# Patient Record
Sex: FEMALE
[De-identification: names, ages, dates, MRNs, and addresses within clinical notes are randomized per-mention and may not be internally consistent; named-entity substitution may affect disease eponyms.]

---

## 2019-04-29 PROBLEM — Z00.00 ENCOUNTER FOR PREVENTIVE HEALTH EXAMINATION: Status: ACTIVE | Noted: 2019-04-29

## 2019-04-30 ENCOUNTER — APPOINTMENT (OUTPATIENT)
Dept: ORTHOPEDIC SURGERY | Facility: CLINIC | Age: 84
End: 2019-04-30
Payer: MEDICARE

## 2019-04-30 VITALS — BODY MASS INDEX: 22.49 KG/M2 | HEIGHT: 65 IN | WEIGHT: 135 LBS

## 2019-04-30 DIAGNOSIS — M17.11 UNILATERAL PRIMARY OSTEOARTHRITIS, RIGHT KNEE: ICD-10-CM

## 2019-04-30 DIAGNOSIS — M17.12 UNILATERAL PRIMARY OSTEOARTHRITIS, LEFT KNEE: ICD-10-CM

## 2019-04-30 PROCEDURE — 20610 DRAIN/INJ JOINT/BURSA W/O US: CPT | Mod: 50

## 2019-04-30 PROCEDURE — 99213 OFFICE O/P EST LOW 20 MIN: CPT | Mod: 25

## 2019-04-30 RX ORDER — ACETAMINOPHEN AND CODEINE 300; 30 MG/1; MG/1
300-30 TABLET ORAL 4 TIMES DAILY
Qty: 60 | Refills: 0 | Status: ACTIVE | COMMUNITY
Start: 2019-04-30 | End: 1900-01-01

## 2019-04-30 NOTE — DISCUSSION/SUMMARY
[de-identified] : This patient was given an injection into each knee cortisone. She tolerated procedure well. I will send a prescription for Tylenol with codeine. She will follow up every couple months for injections as she does not want to have surgery.

## 2019-04-30 NOTE — PHYSICAL EXAM
[de-identified] : Bilateral knee exam shows crepitus on range of motion swelling there is joint line tenderness. Negative Zuri test no instability neurovascular exam is normal. Range of motion full extension to 120° of flexion

## 2019-04-30 NOTE — PROCEDURE
[de-identified] : Bilateral knee injections were given with 40 mg of Kenalog 3 cc 1% lidocaine in each knee.

## 2019-04-30 NOTE — HISTORY OF PRESENT ILLNESS
[de-identified] : Here for followup evaluation of both knees. She is here with her grandson. She was last seen in December. She had injections in both knees. She would like repeat injections today.

## 2019-09-17 ENCOUNTER — APPOINTMENT (OUTPATIENT)
Dept: ORTHOPEDIC SURGERY | Facility: CLINIC | Age: 84
End: 2019-09-17
Payer: MEDICARE

## 2019-09-17 DIAGNOSIS — M17.0 BILATERAL PRIMARY OSTEOARTHRITIS OF KNEE: ICD-10-CM

## 2019-09-17 PROCEDURE — 99212 OFFICE O/P EST SF 10 MIN: CPT | Mod: 25

## 2019-09-17 PROCEDURE — 20610 DRAIN/INJ JOINT/BURSA W/O US: CPT | Mod: 50

## 2019-09-17 RX ORDER — HYALURONATE SODIUM 30 MG/2 ML
30 SYRINGE (ML) INTRAARTICULAR
Qty: 6 | Refills: 0 | Status: ACTIVE | OUTPATIENT
Start: 2019-09-17

## 2019-09-17 RX ORDER — ACETAMINOPHEN AND CODEINE PHOSPHATE 300; 30 MG/1; MG/1
300-30 TABLET ORAL 3 TIMES DAILY
Qty: 60 | Refills: 0 | Status: ACTIVE | COMMUNITY
Start: 2019-09-17 | End: 1900-01-01

## 2019-09-17 NOTE — PHYSICAL EXAM
[de-identified] : Bilateral knee exam shows no warmth or swelling there is crepitus no instability neurovascular exam is normal. She does walk with a walker.

## 2019-09-17 NOTE — DISCUSSION/SUMMARY
[de-identified] : Kenalog injection given into each knee today. In order the Orthovisc for each knee. All be done after getting the medication. i will prescription for Tylenol with Codeine for

## 2019-09-17 NOTE — HISTORY OF PRESENT ILLNESS
[de-identified] : Here for followup of both knees she is here with her son. The pain is significant. She would like to do injections. I would also ordered a gel injections at her son's request

## 2019-10-03 ENCOUNTER — OTHER (OUTPATIENT)
Age: 84
End: 2019-10-03